# Patient Record
Sex: FEMALE | Race: WHITE | Employment: OTHER | URBAN - METROPOLITAN AREA
[De-identification: names, ages, dates, MRNs, and addresses within clinical notes are randomized per-mention and may not be internally consistent; named-entity substitution may affect disease eponyms.]

---

## 2017-09-03 PROBLEM — E66.01 MORBID OBESITY (HCC): Status: ACTIVE | Noted: 2017-09-03

## 2017-09-03 PROBLEM — L03.90 CELLULITIS: Status: ACTIVE | Noted: 2017-09-03

## 2017-09-03 PROBLEM — N18.30 CKD (CHRONIC KIDNEY DISEASE), STAGE III (HCC): Status: ACTIVE | Noted: 2017-09-03

## 2020-12-19 ENCOUNTER — APPOINTMENT (OUTPATIENT)
Dept: GENERAL RADIOLOGY | Age: 80
End: 2020-12-19
Payer: MEDICARE

## 2020-12-19 ENCOUNTER — HOSPITAL ENCOUNTER (EMERGENCY)
Age: 80
Discharge: HOME | End: 2020-12-19
Attending: EMERGENCY MEDICINE
Payer: MEDICARE

## 2020-12-19 VITALS
HEART RATE: 102 BPM | TEMPERATURE: 98.1 F | DIASTOLIC BLOOD PRESSURE: 84 MMHG | RESPIRATION RATE: 17 BRPM | SYSTOLIC BLOOD PRESSURE: 146 MMHG | WEIGHT: 286 LBS | OXYGEN SATURATION: 98 % | BODY MASS INDEX: 52.63 KG/M2 | HEIGHT: 62 IN

## 2020-12-19 LAB
A/G RATIO: 1.3 (ref 1.1–2.2)
ALBUMIN SERPL-MCNC: 4 G/DL (ref 3.4–5)
ALP BLD-CCNC: 114 U/L (ref 40–129)
ALT SERPL-CCNC: 10 U/L (ref 10–40)
ANION GAP SERPL CALCULATED.3IONS-SCNC: 9 MMOL/L (ref 3–16)
AST SERPL-CCNC: 17 U/L (ref 15–37)
BASOPHILS ABSOLUTE: 0 K/UL (ref 0–0.2)
BASOPHILS RELATIVE PERCENT: 0.5 %
BILIRUB SERPL-MCNC: 0.5 MG/DL (ref 0–1)
BILIRUBIN URINE: NEGATIVE
BLOOD, URINE: NEGATIVE
BUN BLDV-MCNC: 26 MG/DL (ref 7–20)
CALCIUM SERPL-MCNC: 9.6 MG/DL (ref 8.3–10.6)
CHLORIDE BLD-SCNC: 101 MMOL/L (ref 99–110)
CLARITY: CLEAR
CO2: 29 MMOL/L (ref 21–32)
COLOR: YELLOW
CREAT SERPL-MCNC: 1 MG/DL (ref 0.6–1.2)
EKG ATRIAL RATE: 357 BPM
EKG DIAGNOSIS: NORMAL
EKG Q-T INTERVAL: 360 MS
EKG QRS DURATION: 86 MS
EKG QTC CALCULATION (BAZETT): 450 MS
EKG R AXIS: -50 DEGREES
EKG T AXIS: 88 DEGREES
EKG VENTRICULAR RATE: 94 BPM
EOSINOPHILS ABSOLUTE: 0.1 K/UL (ref 0–0.6)
EOSINOPHILS RELATIVE PERCENT: 1.8 %
GFR AFRICAN AMERICAN: >60
GFR NON-AFRICAN AMERICAN: 53
GLOBULIN: 3 G/DL
GLUCOSE BLD-MCNC: 111 MG/DL (ref 70–99)
GLUCOSE URINE: NEGATIVE MG/DL
HCT VFR BLD CALC: 40.2 % (ref 36–48)
HEMOGLOBIN: 13 G/DL (ref 12–16)
INR BLD: 1.21 (ref 0.86–1.14)
KETONES, URINE: NEGATIVE MG/DL
LEUKOCYTE ESTERASE, URINE: NEGATIVE
LYMPHOCYTES ABSOLUTE: 1.6 K/UL (ref 1–5.1)
LYMPHOCYTES RELATIVE PERCENT: 26.9 %
MCH RBC QN AUTO: 30.5 PG (ref 26–34)
MCHC RBC AUTO-ENTMCNC: 32.4 G/DL (ref 31–36)
MCV RBC AUTO: 94.4 FL (ref 80–100)
MICROSCOPIC EXAMINATION: NORMAL
MONOCYTES ABSOLUTE: 0.5 K/UL (ref 0–1.3)
MONOCYTES RELATIVE PERCENT: 8.3 %
NEUTROPHILS ABSOLUTE: 3.7 K/UL (ref 1.7–7.7)
NEUTROPHILS RELATIVE PERCENT: 62.5 %
NITRITE, URINE: NEGATIVE
PDW BLD-RTO: 13.7 % (ref 12.4–15.4)
PH UA: 7.5 (ref 5–8)
PLATELET # BLD: 228 K/UL (ref 135–450)
PMV BLD AUTO: 8.1 FL (ref 5–10.5)
POTASSIUM REFLEX MAGNESIUM: 4.3 MMOL/L (ref 3.5–5.1)
PROTEIN UA: NEGATIVE MG/DL
PROTHROMBIN TIME: 14.1 SEC (ref 10–13.2)
RBC # BLD: 4.26 M/UL (ref 4–5.2)
SODIUM BLD-SCNC: 139 MMOL/L (ref 136–145)
SPECIFIC GRAVITY UA: 1.01 (ref 1–1.03)
TOTAL PROTEIN: 7 G/DL (ref 6.4–8.2)
TROPONIN: <0.01 NG/ML
URINE REFLEX TO CULTURE: NORMAL
URINE TYPE: NORMAL
UROBILINOGEN, URINE: 0.2 E.U./DL
WBC # BLD: 6 K/UL (ref 4–11)

## 2020-12-19 PROCEDURE — 85025 COMPLETE CBC W/AUTO DIFF WBC: CPT

## 2020-12-19 PROCEDURE — 93005 ELECTROCARDIOGRAM TRACING: CPT | Performed by: EMERGENCY MEDICINE

## 2020-12-19 PROCEDURE — 71045 X-RAY EXAM CHEST 1 VIEW: CPT

## 2020-12-19 PROCEDURE — 96374 THER/PROPH/DIAG INJ IV PUSH: CPT

## 2020-12-19 PROCEDURE — 85610 PROTHROMBIN TIME: CPT

## 2020-12-19 PROCEDURE — 81003 URINALYSIS AUTO W/O SCOPE: CPT

## 2020-12-19 PROCEDURE — 99285 EMERGENCY DEPT VISIT HI MDM: CPT

## 2020-12-19 PROCEDURE — 80053 COMPREHEN METABOLIC PANEL: CPT

## 2020-12-19 PROCEDURE — 2500000003 HC RX 250 WO HCPCS: Performed by: EMERGENCY MEDICINE

## 2020-12-19 PROCEDURE — 84484 ASSAY OF TROPONIN QUANT: CPT

## 2020-12-19 RX ORDER — METOPROLOL TARTRATE 5 MG/5ML
2.5 INJECTION INTRAVENOUS ONCE
Status: COMPLETED | OUTPATIENT
Start: 2020-12-19 | End: 2020-12-19

## 2020-12-19 RX ADMIN — METOPROLOL TARTRATE 2.5 MG: 5 INJECTION, SOLUTION INTRAVENOUS at 08:37

## 2020-12-19 NOTE — ED NOTES
Patient discharged with all belongings and discharge paperwork. Patient verbalized understanding of discharge instructions and follow up with cardiology. Patient taken by wheelchair out of ED to son Phuong Nathan.        Slick Evans RN  12/19/20 7342

## 2020-12-19 NOTE — ED PROVIDER NOTES
[facility]  ED  EMERGENCY DEPARTMENT ENCOUNTER      Pt Name: Fede Fuchs  MRN: [id number]  [place] 1940  Date of evaluation: 12/19/2020  Provider: Ronaldo Coelho MD    CHIEF COMPLAINT       Chief Complaint   Patient presents with    Palpitations     patient states history of AFIB, states the last two days she has been woken up from sleep with palpitations.  Panic Attack         HISTORY OF PRESENT ILLNESS   (Location/Symptom, Timing/Onset, Context/Setting, Quality, Duration, Modifying Factors, Severity)  Note limiting factors. Fede Fuchs is a 79 y.o. female with past medical history of hypertension, chronic kidney disease and atrial fibrillation on anticoagulation here today for palpitations    The patient states over the last 2 to 3 days she has woken up in the morning with palpitations. She states she feels like her heart is racing. It is worse when she lays on her left side. She notes the last 15 to 20 minutes but then resolves. This morning she states that while this was happening she became anxious and think she had a mild panic attack. She now states she feels back to normal.  She has no chest pain, shortness of breath or palpitations at present. No lightheadedness or dizziness. No syncope. She notes she did take all of her morning medications and feels better at this time. No nausea or vomiting. No dysuria or hematuria. [facility]    Nursing Notes were reviewed. REVIEW OF SYSTEMS    (2-9 systems for level 4, 10 or more for level 5)     Review of Systems    Please see HPI for pertinent positive and negative review of system findings. A full 10 system ROS was performed and otherwise negative.         PAST MEDICAL HISTORY     Past Medical History:   Diagnosis Date    Cancer of breast (female)     left    Hypertension          SURGICAL HISTORY       Past Surgical History:   Procedure Laterality Date    BACK SURGERY      CHOLECYSTECTOMY      HERNIA REPAIR      HYSTERECTOMY      MASTECTOMY Left          CURRENT MEDICATIONS       Previous Medications    ALBUTEROL (PROVENTIL HFA) 108 (90 BASE) MCG/ACT INHALER    Inhale 2 puffs into the lungs every 6 hours as needed for Wheezing. ALLOPURINOL (ZYLOPRIM) 100 MG TABLET    Take 100 mg by mouth daily. AMLODIPINE (NORVASC) 5 MG TABLET    Take 5 mg by mouth daily. DICLOFENAC (VOLTAREN) 75 MG EC TABLET    Take 75 mg by mouth 2 times daily. FOSINOPRIL (MONOPRIL) 10 MG TABLET    Take 10 mg by mouth daily. FUROSEMIDE (LASIX) 40 MG TABLET    Take 40 mg by mouth 2 times daily. NORTRIPTYLINE (PAMELOR) 10 MG CAPSULE    Take 20 mg by mouth 2 times daily. 2caps, 2x daily, 20mg twice a day    OMEPRAZOLE (PRILOSEC) 20 MG CAPSULE    Take 20 mg by mouth daily. POTASSIUM CHLORIDE (KLOR-CON) 10 MEQ CR TABLET    Take 8 mEq by mouth 2 times daily. Take 2 tabs per day with exception of 3 per day on  and     VITAMIN D (CHOLECALCIFEROL) 5000 UNITS CAPS CAPSULE    Take 2,000 Units by mouth daily       ALLERGIES     Sulfa antibiotics    FAMILY HISTORY     History reviewed. No pertinent family history. SOCIAL HISTORY       Social History     Socioeconomic History    Marital status:       Spouse name: None    Number of children: None    Years of education: None    Highest education level: None   Occupational History    None   Social Needs    Financial resource strain: None    Food insecurity     Worry: None     Inability: None    Transportation needs     Medical: None     Non-medical: None   Tobacco Use    Smoking status: Former Smoker     Types: Cigarettes     Quit date: 1/3/2006     Years since quittin.9    Smokeless tobacco: Never Used   Substance and Sexual Activity    Alcohol use: No    Drug use: No    Sexual activity: None   Lifestyle    Physical activity     Days per week: None     Minutes per session: None    Stress: None   Relationships    Social connections     Talks on phone: None     Gets together: None     Attends Mu-ism service: None     Active member of club or organization: None     Attends meetings of clubs or organizations: None     Relationship status: None    Intimate partner violence     Fear of current or ex partner: None     Emotionally abused: None     Physically abused: None     Forced sexual activity: None   Other Topics Concern    None   Social History Narrative    None       SCREENINGS    [place] Coma Scale  Eye Opening: Spontaneous  Best Verbal Response: Oriented  Best Motor Response: Obeys commands  Raquel Coma Scale Score: 15          PHYSICAL EXAM    (up to 7 for level 4, 8 or more for level 5)     ED Triage Vitals   BP Temp Temp Source Pulse Resp SpO2 Height Weight   12/19/20 0716 12/19/20 0711 12/19/20 0711 12/19/20 0711 12/19/20 0711 12/19/20 0716 12/19/20 0711 12/19/20 0711   (!) 148/91 98.1 °F (36.7 °C) Oral 94 19 96 % 5' 2\" (1.575 m) 286 lb (129.7 kg)       Physical Exam    General appearance:  Cooperative. Morbidly obese  female in no acute distress. Skin:  Warm. Dry. Eye:  Extraocular movements intact. Ears, nose, mouth and throat:  Oral mucosa moist,  Neck:  Trachea midline. Heart: Irregularly irregular rhythm with normal rate  Perfusion:  intact  Respiratory:  Lungs clear to auscultation bilaterally. Respirations nonlabored. Abdominal:   Non distended. Nontender  Neurological:  Alert and oriented x 3.   Moves all extremities spontaneously  Musculoskeletal:   Normal ROM, no deformities          Psychiatric:  Normal mood      DIAGNOSTIC RESULTS       Labs Reviewed   COMPREHENSIVE METABOLIC PANEL W/ REFLEX TO MG FOR LOW K - Abnormal; Notable for the following components:       Result Value    Glucose 111 (*)     BUN 26 (*)     GFR Non- 53 (*)     All other components within normal limits    Narrative:     Performed at:  [facility]) - [facility]  [address] [place] [address],  [place], [address] [place] [address] Phone [phone] - Abnormal; Notable for the following components:    Protime 14.1 (*)     INR 1.21 (*)     All other components within normal limits    Narrative:     Performed at:  [address], [facility] [employer]   Phone [phone]   CBC WITH AUTO DIFFERENTIAL    Narrative:     Performed at:  [address], [facility] [employer]   Phone [phone]   TROPONIN    Narrative:     Performed at:  [address], [facility] [employer]   Phone [phone]   URINE RT REFLEX TO CULTURE    Narrative:     Performed at:  [address], [facility] [employer]   Phone [phone]       Interpretation per the Radiologist below, if obtained/available at the time of this note:    XR CHEST PORTABLE   Final Result   1. No acute cardiopulmonary process identified. 2. Mild cardiomegaly, unchanged. All other labs/imaging were within normal range or not returned as of this dictation. EMERGENCY DEPARTMENT COURSE and DIFFERENTIAL DIAGNOSIS/MDM:   Vitals:    Vitals:    12/19/20 0711 12/19/20 0716 12/19/20 0836   BP:  (!) 148/91 137/88   Pulse: 94  121   Resp: 19  18   Temp: 98.1 °F (36.7 °C)     TempSrc: Oral     SpO2:  96% 97%   Weight: 286 lb (129.7 kg)     Height: 5' 2\" (1.575 m)         EKG: Atrial fibrillation at a rate of 94 bpm.  Left anterior fascicular block. No ST elevation. When compared to prior EKG from 9/2/2017 the atrial fibrillation is new. The patient does state however she has a diagnosed history of A. fib and on her most recent cardiology appointment was in A. fib at that time    Patient presented the emergency department today for intermittent palpitations. Has a history of atrial fibrillation and is typically in A. fib.   Here heart rate primarily in the 90s but briefly did rise up to 120 and 130 was given a very low dose of metoprolol which controlled her rate quite well. The patient states she thinks she is on Cardizem but is unsure of the dose. We attempted to contact her pharmacy but they are closed for the weekend. Patient does have a cardiologist and she states she can easily call him and adjust any medications. She is resting comfortably at present with a controlled rate and unremarkable laboratory studies and will be discharged home with strict return precautions but otherwise will follow up with her cardiologist as an outpatient    MDM    CONSULTS     None    Critical Care:   None    REASSESSMENT          PROCEDURE     Unless otherwise noted below, none     Procedures      FINAL IMPRESSION      1. Atrial fibrillation with [facility])            DISPOSITION/PLAN   DISPOSITION Decision To Discharge 12/19/2020 09:02:20 AM        PATIENT REFERRED TO:  Gelacio Mak [id number]  [phone]    Schedule an appointment as soon as possible for a visit       [facility] [address]  [facility] [place] [phone]    Schedule an appointment as soon as possible for a visit   Call to discuss medication changes to control your heart rate      DISCHARGE MEDICATIONS:  New Prescriptions    No medications on file     Controlled Substances Monitoring:     No flowsheet data found.     (Please note that portions of this note were completed with a voice recognition program.  Efforts were made to edit the dictations but occasionally words are mis-transcribed.)    Kacey Rebollar MD (electronically signed)  Attending Emergency Physician            Ralph Riley MD  12/19/20 2847

## 2020-12-19 NOTE — ED NOTES
Patients son Carmen Andersen called by RN at this time per patient permission. Patients son awaiting call back once lab work is resulted. Son verbalized understanding.       Angel Fernández RN  12/19/20 0630

## 2020-12-19 NOTE — ED NOTES
Patients son Sharon Ling called by RN. Updated on test results and patients discharge. States he will be to ED in about 15 minutes to  patient.       Dilcia Ledezma RN  12/19/20 3462

## 2020-12-19 NOTE — ED NOTES
Bed: 09  Expected date:   Expected time:   Means of arrival:   Comments:  Marlee Arrington RN  12/19/20 3571

## 2025-08-21 ENCOUNTER — HOSPITAL ENCOUNTER (EMERGENCY)
Age: 85
Discharge: HOME | End: 2025-08-21
Payer: MEDICARE

## 2025-08-21 VITALS
WEIGHT: 246 LBS | BODY MASS INDEX: 45.27 KG/M2 | TEMPERATURE: 97.6 F | SYSTOLIC BLOOD PRESSURE: 102 MMHG | DIASTOLIC BLOOD PRESSURE: 73 MMHG | HEIGHT: 62 IN | HEART RATE: 82 BPM | RESPIRATION RATE: 20 BRPM | OXYGEN SATURATION: 95 %

## 2025-08-21 DIAGNOSIS — R60.0 LEG EDEMA: Primary | ICD-10-CM

## 2025-08-21 LAB
ALBUMIN SERPL-MCNC: 4.1 G/DL (ref 3.4–5)
ALBUMIN/GLOB SERPL: 1.5 {RATIO} (ref 1.1–2.2)
ALP SERPL-CCNC: 162 U/L (ref 40–129)
ALT SERPL-CCNC: 16 U/L (ref 10–40)
ANION GAP SERPL CALCULATED.3IONS-SCNC: 11 MMOL/L (ref 3–16)
AST SERPL-CCNC: 22 U/L (ref 15–37)
BASOPHILS # BLD: 0 K/UL (ref 0–0.2)
BASOPHILS NFR BLD: 0.6 %
BILIRUB SERPL-MCNC: 0.5 MG/DL (ref 0–1)
BUN SERPL-MCNC: 38 MG/DL (ref 7–20)
CALCIUM SERPL-MCNC: 9.6 MG/DL (ref 8.3–10.6)
CHLORIDE SERPL-SCNC: 100 MMOL/L (ref 99–110)
CO2 SERPL-SCNC: 30 MMOL/L (ref 21–32)
CREAT SERPL-MCNC: 1.1 MG/DL (ref 0.6–1.2)
DEPRECATED RDW RBC AUTO: 14.7 % (ref 12.4–15.4)
EKG DIAGNOSIS: NORMAL
EKG Q-T INTERVAL: 374 MS
EKG QRS DURATION: 88 MS
EKG QTC CALCULATION (BAZETT): 439 MS
EKG R AXIS: 117 DEGREES
EKG T AXIS: -4 DEGREES
EKG VENTRICULAR RATE: 83 BPM
EOSINOPHIL # BLD: 0.3 K/UL (ref 0–0.6)
EOSINOPHIL NFR BLD: 4 %
GFR SERPLBLD CREATININE-BSD FMLA CKD-EPI: 49 ML/MIN/{1.73_M2}
GLUCOSE SERPL-MCNC: 102 MG/DL (ref 70–99)
HCT VFR BLD AUTO: 38.1 % (ref 36–48)
HGB BLD-MCNC: 12.5 G/DL (ref 12–16)
LYMPHOCYTES # BLD: 1.4 K/UL (ref 1–5.1)
LYMPHOCYTES NFR BLD: 19.7 %
MCH RBC QN AUTO: 30.7 PG (ref 26–34)
MCHC RBC AUTO-ENTMCNC: 32.9 G/DL (ref 31–36)
MCV RBC AUTO: 93.3 FL (ref 80–100)
MONOCYTES # BLD: 0.7 K/UL (ref 0–1.3)
MONOCYTES NFR BLD: 9.6 %
NEUTROPHILS # BLD: 4.8 K/UL (ref 1.7–7.7)
NEUTROPHILS NFR BLD: 66.1 %
NT-PROBNP SERPL-MCNC: 1024 PG/ML (ref 0–449)
PLATELET # BLD AUTO: 163 K/UL (ref 135–450)
PMV BLD AUTO: 8 FL (ref 5–10.5)
POTASSIUM SERPL-SCNC: 3.9 MMOL/L (ref 3.5–5.1)
PROT SERPL-MCNC: 6.8 G/DL (ref 6.4–8.2)
RBC # BLD AUTO: 4.08 M/UL (ref 4–5.2)
SODIUM SERPL-SCNC: 141 MMOL/L (ref 136–145)
TROPONIN, HIGH SENSITIVITY: 19 NG/L (ref 0–14)
TROPONIN, HIGH SENSITIVITY: 22 NG/L (ref 0–14)
WBC # BLD AUTO: 7.2 K/UL (ref 4–11)

## 2025-08-21 PROCEDURE — 96374 THER/PROPH/DIAG INJ IV PUSH: CPT

## 2025-08-21 PROCEDURE — 84484 ASSAY OF TROPONIN QUANT: CPT

## 2025-08-21 PROCEDURE — 83880 ASSAY OF NATRIURETIC PEPTIDE: CPT

## 2025-08-21 PROCEDURE — 80053 COMPREHEN METABOLIC PANEL: CPT

## 2025-08-21 PROCEDURE — 99284 EMERGENCY DEPT VISIT MOD MDM: CPT

## 2025-08-21 PROCEDURE — 6360000002 HC RX W HCPCS: Performed by: PHYSICIAN ASSISTANT

## 2025-08-21 PROCEDURE — 85025 COMPLETE CBC W/AUTO DIFF WBC: CPT

## 2025-08-21 PROCEDURE — 93005 ELECTROCARDIOGRAM TRACING: CPT | Performed by: PHYSICIAN ASSISTANT

## 2025-08-21 PROCEDURE — 93010 ELECTROCARDIOGRAM REPORT: CPT | Performed by: INTERNAL MEDICINE

## 2025-08-21 RX ORDER — FUROSEMIDE 10 MG/ML
40 INJECTION INTRAMUSCULAR; INTRAVENOUS ONCE
Status: COMPLETED | OUTPATIENT
Start: 2025-08-21 | End: 2025-08-21

## 2025-08-21 RX ADMIN — FUROSEMIDE 40 MG: 10 INJECTION, SOLUTION INTRAMUSCULAR; INTRAVENOUS at 15:13

## 2025-08-21 ASSESSMENT — LIFESTYLE VARIABLES
HOW OFTEN DO YOU HAVE A DRINK CONTAINING ALCOHOL: NEVER
HOW MANY STANDARD DRINKS CONTAINING ALCOHOL DO YOU HAVE ON A TYPICAL DAY: PATIENT DOES NOT DRINK